# Patient Record
Sex: FEMALE | Race: WHITE | NOT HISPANIC OR LATINO | URBAN - METROPOLITAN AREA
[De-identification: names, ages, dates, MRNs, and addresses within clinical notes are randomized per-mention and may not be internally consistent; named-entity substitution may affect disease eponyms.]

---

## 2024-03-19 ENCOUNTER — OV NP (OUTPATIENT)
Dept: URBAN - METROPOLITAN AREA CLINIC 50 | Facility: CLINIC | Age: 34
End: 2024-03-19
Payer: COMMERCIAL

## 2024-03-19 ENCOUNTER — LAB (OUTPATIENT)
Dept: URBAN - METROPOLITAN AREA CLINIC 50 | Facility: CLINIC | Age: 34
End: 2024-03-19

## 2024-03-19 VITALS
WEIGHT: 123 LBS | TEMPERATURE: 98.5 F | DIASTOLIC BLOOD PRESSURE: 70 MMHG | BODY MASS INDEX: 23.22 KG/M2 | SYSTOLIC BLOOD PRESSURE: 114 MMHG | HEART RATE: 71 BPM | HEIGHT: 61 IN

## 2024-03-19 DIAGNOSIS — R19.8 ABNORMAL BOWEL HABITS: ICD-10-CM

## 2024-03-19 DIAGNOSIS — R14.0 ABDOMINAL BLOATING: ICD-10-CM

## 2024-03-19 DIAGNOSIS — K30 INDIGESTION: ICD-10-CM

## 2024-03-19 DIAGNOSIS — K59.00 CONSTIPATION, UNSPECIFIED CONSTIPATION TYPE: ICD-10-CM

## 2024-03-19 DIAGNOSIS — Z86.19 HISTORY OF HELICOBACTER PYLORI INFECTION: ICD-10-CM

## 2024-03-19 DIAGNOSIS — Z87.19 HISTORY OF PANCREATITIS: ICD-10-CM

## 2024-03-19 PROBLEM — 162031009: Status: ACTIVE | Noted: 2024-03-19

## 2024-03-19 PROBLEM — 14760008: Status: ACTIVE | Noted: 2024-03-19

## 2024-03-19 PROCEDURE — 99204 OFFICE O/P NEW MOD 45 MIN: CPT | Performed by: PHYSICIAN ASSISTANT

## 2024-03-19 RX ORDER — OMEPRAZOLE 40 MG/1
1 CAPSULE 30 MINUTES BEFORE MORNING MEAL CAPSULE, DELAYED RELEASE ORAL ONCE A DAY
Qty: 30 | Refills: 1 | OUTPATIENT
Start: 2024-03-19

## 2024-03-19 NOTE — HPI-TODAY'S VISIT:
Patient is 34 y/o F here to dicsuss bloating and discomfort Of note, states has hx recurrent pancreatitis, attributed to gallbladder issue in past Recently moved from [facility] to [place] for work, is  Previously followed by gastroenterology in [facility] who was following pancreatitis hx Lately is having indigestion concerns Feels bloated, distended Feels poor productive stools, incompelte evacuation despite eating a lot, frequent straining, bowel habits changed w this BMs presently once daily in morning No heartburn, reflux, dysphagia No abnormal weight loss, appetite good, appetite increased No abdominal pains, feels different from prev espidoes of pancreatitis, more discomfort/bloating than pain Denies recent blood work, maybe around June 2023, normal per patient Reports had prev EGD in [facility] around 2020 Was told had H pylori but deferred treatment due to pregnacny at that time, no treatment since Tried stool softener, didn't much help symptoms No recent abx, ppi, peptobismol Wants to check gallbladder, stool studies given concern

## 2024-03-26 LAB
CALPROTECTIN, FECAL: 9
CAMPYLOBACTER SPP. AG,EIA: (no result)
PANCREATIC ELASTASE, FECAL: >500
RESULT:: (no result)
SALMONELLA AND SHIGELLA, CULTURE: (no result)
SHIGA TOXINS, EIA W/RFL TO E.COLI O157 CULTURE: (no result)